# Patient Record
Sex: FEMALE | Race: WHITE | URBAN - METROPOLITAN AREA
[De-identification: names, ages, dates, MRNs, and addresses within clinical notes are randomized per-mention and may not be internally consistent; named-entity substitution may affect disease eponyms.]

---

## 2021-04-22 ENCOUNTER — HOSPITAL ENCOUNTER (OUTPATIENT)
Age: 36
Setting detail: SPECIMEN
Discharge: HOME | End: 2021-04-22
Payer: COMMERCIAL

## 2021-04-22 ENCOUNTER — OFFICE VISIT (OUTPATIENT)
Dept: OBGYN CLINIC | Age: 36
End: 2021-04-22
Payer: COMMERCIAL

## 2021-04-22 VITALS
DIASTOLIC BLOOD PRESSURE: 68 MMHG | SYSTOLIC BLOOD PRESSURE: 102 MMHG | WEIGHT: 181.6 LBS | BODY MASS INDEX: 29.18 KG/M2 | HEIGHT: 66 IN

## 2021-04-22 DIAGNOSIS — Z12.4 SCREENING FOR CERVICAL CANCER: ICD-10-CM

## 2021-04-22 DIAGNOSIS — Z86.19 HISTORY OF HPV INFECTION: ICD-10-CM

## 2021-04-22 DIAGNOSIS — Z87.42 HISTORY OF ABNORMAL CERVICAL PAP SMEAR: ICD-10-CM

## 2021-04-22 DIAGNOSIS — N92.1 MENORRHAGIA WITH IRREGULAR CYCLE: ICD-10-CM

## 2021-04-22 DIAGNOSIS — Z01.411 ABNORMAL FEMALE PELVIC EXAM: Primary | ICD-10-CM

## 2021-04-22 PROCEDURE — 99395 PREV VISIT EST AGE 18-39: CPT | Performed by: ADVANCED PRACTICE MIDWIFE

## 2021-04-22 ASSESSMENT — ENCOUNTER SYMPTOMS
DIARRHEA: 0
GASTROINTESTINAL NEGATIVE: 1
ABDOMINAL PAIN: 0
RESPIRATORY NEGATIVE: 1
CONSTIPATION: 0
SHORTNESS OF BREATH: 0

## 2021-04-22 NOTE — PROGRESS NOTES
YEARLY PHYSICAL    Date of service: 2021    Bonnie Silver  Is a 28 y.o.   female    PT's PCP is: No primary care provider on file. : 1985                                             Subjective:       Patient's last menstrual period was 2021. Are your menses regular: no    OB History    Para Term  AB Living   3 2 2   1 2   SAB TAB Ectopic Molar Multiple Live Births   1         2      # Outcome Date GA Lbr Jeovanny/2nd Weight Sex Delivery Anes PTL Lv   3 Term 17    M CS-LTranv Spinal  MICHAEL   2 Term 14    F CS-LTranv   MICHAEL   1 SAB                 Social History     Tobacco Use   Smoking Status Never Smoker   Smokeless Tobacco Never Used        Social History     Substance and Sexual Activity   Alcohol Use Yes    Comment: social       Family History   Problem Relation Age of Onset    Cancer Maternal Grandmother         leukemia    Hypertension Mother     Lung Cancer Maternal Uncle        Any family history of breast or ovarian cancer: No    Any family history of blood clots: No      Allergies: Patient has no known allergies. No current outpatient medications on file. Social History     Substance and Sexual Activity   Sexual Activity Not on file       Any bleeding or pain with intercourse: No    Last Yearly:      Last pap:  - Normal cytology    Last HPV: 2020 - Positive     Do you do self breast exams: Yes    Past Medical History:   Diagnosis Date    Abnormal Pap smear of cervix 2018 NL cytology, +HPV. - LGSIL, +HPV. - colpo CIN1. 12- nl cytology +HPV.  2020 NL cytology, +HPV    Eczema     HPV (human papilloma virus) anogenital infection     Psoriasis     Thyroid goiter        Past Surgical History:   Procedure Laterality Date     SECTION      [id number],3639    KNEE SURGERY Right     ACL/MCL    TUBAL LIGATION  2017       Family History   Problem Relation Age of Onset    Cancer Maternal Grandmother         leukemia    Hypertension Mother     Lung Cancer Maternal Uncle        Chief Complaint   Patient presents with    Annual Exam     Annual, Hx abnormal paps. Colpo 3/2020. Doing well, no concerns otherwise. Labs:    No results found for this visit on 04/22/21. HPI: Denies breast concerns aside from does have tenderness week or so before menses occurs. Irregular cycles - not new but getting much heavier with clots. Due for repeat cotesting today (HPV positive since 2016), nl colpo in 2020. Monogamous relationship. Would like to consider hyst if still HPV positive or w/u for irreg menses and possible hyst for that concern. Review of Systems   Constitutional: Negative. Negative for chills, fatigue and fever. Right breast tenderness week prior to menses - resolves with the bleeding of cycle   HENT: Negative. Respiratory: Negative. Negative for shortness of breath. Cardiovascular: Negative. Negative for chest pain. Gastrointestinal: Negative. Negative for abdominal pain, constipation and diarrhea. Genitourinary: Positive for menstrual problem (Irregular menses, heavy with clots for 3 days. Soaks through tampons/pads on heaviest days). Negative for dysuria, enuresis, frequency, pelvic pain, urgency and vaginal bleeding. Musculoskeletal: Negative. Neurological: Negative. Negative for dizziness, light-headedness and headaches. Psychiatric/Behavioral: Negative. Objective  Blood pressure 102/68, height 5' 6\" (1.676 m), weight 181 lb 9.6 oz (82.4 kg), last menstrual period 03/11/2021. Physical Exam  Constitutional:       Appearance: Normal appearance. She is normal weight. Genitourinary:      Pelvic exam was performed with patient in the lithotomy position. Vulva, urethra, vagina, cervix, uterus, right adnexa and left adnexa normal.      Uterus is anteverted and regular.    HENT:      Head: Normocephalic. Eyes:      Pupils: Pupils are equal, round, and reactive to light. Neck:      Musculoskeletal: Normal range of motion. No muscular tenderness. Cardiovascular:      Rate and Rhythm: Normal rate and regular rhythm. Pulses: Normal pulses. Heart sounds: Normal heart sounds. No murmur. Pulmonary:      Effort: Pulmonary effort is normal.      Breath sounds: Normal breath sounds. No wheezing. Chest:      Breasts:         Right: Normal. No mass, nipple discharge or skin change. Left: Normal. No mass, nipple discharge or skin change. Comments: Bilateral breasts with dense tissue, no masses palpated  Abdominal:      Palpations: Abdomen is soft. Tenderness: There is no abdominal tenderness. Musculoskeletal: Normal range of motion. Neurological:      Mental Status: She is alert and oriented to person, place, and time. Skin:     General: Skin is warm and dry. Psychiatric:         Behavior: Behavior normal.   Vitals signs and nursing note reviewed. Chaperone present: Declined. Assessment and Plan          Diagnosis Orders   1. Abnormal female pelvic exam     2. Screening for cervical cancer  PAP SMEAR   3. History of abnormal cervical Pap smear  PAP SMEAR   4. Menorrhagia with irregular cycle     5. History of HPV infection       Repeat Annual every 1 year  Cervical Cytology Evaluation begins at 24years old. If Negative Cytology, Follow-up screening per current guidelines. Mammograms every 1year. If 37 yo and last mammogram was negative. Calcium and Vitamin D dosing reviewed. Colonoscopy screening reviewed as well as onset for bone density testing. Birth control and barrier recommendationsdiscussed. STD counseling and prevention reviewed. Gardisil counseling completed for all patients. Routine healthmaintenance per patients PCP. Reviewed hx of abn pap and most recent colpo results.   If abn pap today patient would like to consider hyst for persistent HPV infection and fears of future progression of virus in addition to abn menses. Will await pap results prior to scheduling [facility] for imaging of uterus    Reviewed cyclical breast tenderness on the right side - discussed caffeine use and vitamin e OTC. Should she develop changes or tenderness that is not cyclical as has been in the past call for breast imaging. Evy Peña does not currently have medications on file. Return in about 1 year (around 4/22/2022) for Yearly. She was also counseled on her preventative health maintenance recommendations and follow-up. There are no Patient Instructions on file for this visit.     Erich ARNETT,4/22/2021 8:41 AM

## 2021-04-27 LAB
CYTOLOGY REPORT: NORMAL
HPV SAMPLE: ABNORMAL
HPV, GENOTYPE 16: NOT DETECTED
HPV, GENOTYPE 18: NOT DETECTED
HPV, HIGH RISK OTHER: DETECTED
HPV, INTERPRETATION: ABNORMAL
SPECIMEN DESCRIPTION: ABNORMAL

## 2021-05-05 ENCOUNTER — TELEPHONE (OUTPATIENT)
Dept: OBGYN CLINIC | Age: 36
End: 2021-05-05

## 2021-05-05 NOTE — TELEPHONE ENCOUNTER
Pt called about her pap results, said she saw them on mychart and wondered what the next step is. She is at work but she said you can leave a detailed msg on her VM. She will try and call again at lunchtime so if she needs sched, let me know, IM if poss.  THANKS!!

## 2021-05-24 DIAGNOSIS — N92.1 MENORRHAGIA WITH IRREGULAR CYCLE: ICD-10-CM

## 2021-05-24 DIAGNOSIS — Z87.42 HISTORY OF ABNORMAL CERVICAL PAP SMEAR: ICD-10-CM

## 2021-05-24 DIAGNOSIS — Z01.411 ABNORMAL FEMALE PELVIC EXAM: Primary | ICD-10-CM

## 2021-05-24 DIAGNOSIS — Z01.812 ENCOUNTER FOR PRE-OPERATIVE LABORATORY TESTING: ICD-10-CM

## 2021-06-02 ENCOUNTER — TELEPHONE (OUTPATIENT)
Dept: OBGYN CLINIC | Age: 36
End: 2021-06-02

## 2021-06-02 NOTE — TELEPHONE ENCOUNTER
Received a voicemail from [place] at [employer]. Patient's insurance only allows her to have surgery at a [facility] facility. I did verify that she would not be able to have surgery and [facility] and it must be a [facility] facility. I left a message on patient's voicemail regarding this, she is to call me back and confirmed that she received the message.

## 2021-06-03 NOTE — TELEPHONE ENCOUNTER
Spoke to patient, she is going to cancel her surgery and will be transferring to have surgery completed. She did speak with her insurance company, they cover her to be seen in the office, but any surgical procedures needs to be done at a [facility] facility. Records request emailed to patient.